# Patient Record
Sex: FEMALE | NOT HISPANIC OR LATINO | Employment: UNEMPLOYED | URBAN - NONMETROPOLITAN AREA
[De-identification: names, ages, dates, MRNs, and addresses within clinical notes are randomized per-mention and may not be internally consistent; named-entity substitution may affect disease eponyms.]

---

## 2022-01-26 LAB — HCV AB SER-ACNC: NEGATIVE

## 2024-05-14 ENCOUNTER — PATIENT OUTREACH (OUTPATIENT)
Dept: FAMILY MEDICINE CLINIC | Facility: CLINIC | Age: 34
End: 2024-05-14

## 2024-05-14 ENCOUNTER — TELEPHONE (OUTPATIENT)
Dept: ADMINISTRATIVE | Facility: OTHER | Age: 34
End: 2024-05-14

## 2024-05-14 ENCOUNTER — OFFICE VISIT (OUTPATIENT)
Dept: FAMILY MEDICINE CLINIC | Facility: CLINIC | Age: 34
End: 2024-05-14
Payer: COMMERCIAL

## 2024-05-14 ENCOUNTER — TELEPHONE (OUTPATIENT)
Dept: PSYCHIATRY | Facility: CLINIC | Age: 34
End: 2024-05-14

## 2024-05-14 VITALS
HEART RATE: 78 BPM | BODY MASS INDEX: 16.98 KG/M2 | RESPIRATION RATE: 18 BRPM | DIASTOLIC BLOOD PRESSURE: 78 MMHG | TEMPERATURE: 98.6 F | OXYGEN SATURATION: 95 % | WEIGHT: 115 LBS | SYSTOLIC BLOOD PRESSURE: 104 MMHG

## 2024-05-14 DIAGNOSIS — Z00.00 ENCOUNTER FOR MEDICAL EXAMINATION TO ESTABLISH CARE: Primary | ICD-10-CM

## 2024-05-14 DIAGNOSIS — Z87.898 H/O DOMESTIC VIOLENCE: ICD-10-CM

## 2024-05-14 DIAGNOSIS — F33.2 SEVERE EPISODE OF RECURRENT MAJOR DEPRESSIVE DISORDER, WITHOUT PSYCHOTIC FEATURES (HCC): ICD-10-CM

## 2024-05-14 DIAGNOSIS — Z59.82 TRANSPORTATION INSECURITY: ICD-10-CM

## 2024-05-14 PROCEDURE — 99203 OFFICE O/P NEW LOW 30 MIN: CPT | Performed by: STUDENT IN AN ORGANIZED HEALTH CARE EDUCATION/TRAINING PROGRAM

## 2024-05-14 RX ORDER — DULOXETIN HYDROCHLORIDE 20 MG/1
20 CAPSULE, DELAYED RELEASE ORAL DAILY
Qty: 30 CAPSULE | Refills: 1 | Status: SHIPPED | OUTPATIENT
Start: 2024-05-14 | End: 2024-07-13

## 2024-05-14 SDOH — ECONOMIC STABILITY - TRANSPORTATION SECURITY: TRANSPORTATION INSECURITY: Z59.82
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
## 2024-05-14 NOTE — TELEPHONE ENCOUNTER
Upon review of the In Basket request we were able to locate, review, and update the patient chart as requested for Hepatitis C . And cervical cancer screening    Any additional questions or concerns should be emailed to the Practice Liaisons via the appropriate education email address, please do not reply via In Basket.    Thank you  Tami Boggs MA

## 2024-05-14 NOTE — TELEPHONE ENCOUNTER
Contacted patient in regards to Routine Referral in attempts to verify patient's needs of services and add patient to proper wait list. Unable to LVM VM states is full and cannot received any messages. 1st attempt.
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
## 2024-05-14 NOTE — TELEPHONE ENCOUNTER
----- Message from Luciano Darnell MA sent at 5/13/2024  4:01 PM EDT -----  Regarding: pap hep c  05/13/24 4:02 PM    Hello, our patient Jenelle Ferreira has had Hepatitis C completed/performed. Please assist in updating the patient chart by pulling the Care Everywhere (CE) document. The date of service is 1/26/22.     05/13/24 4:03 PM    Hello, our patient Jenelle Ferreira has had Pap Smear (HPV) aka Cervical Cancer Screening completed/performed. Please assist in updating the patient chart by pulling the Care Everywhere (CE) document. The date of service is 3/24/22.     Thank you,  Luciano Darnell MA  [facility]
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
## 2024-05-17 ENCOUNTER — PATIENT OUTREACH (OUTPATIENT)
Dept: FAMILY MEDICINE CLINIC | Facility: CLINIC | Age: 34
End: 2024-05-17

## 2024-05-17 ENCOUNTER — TELEPHONE (OUTPATIENT)
Dept: PSYCHIATRY | Facility: CLINIC | Age: 34
End: 2024-05-17

## 2024-05-17 NOTE — LETTER
[address] [facility] [id number]    Re: Care Coordination   5/17/2024       Dear Jenelle,    I would like to talk with you about transportation.  Please contact me at [phone].    If you have other questions, please do not hesitate to contact me about those as well.  If I do not have an answer I will assist you in finding the appropriate agency or individual who can help.    Sincerely,         Lavern Galeas

## 2024-05-17 NOTE — PROGRESS NOTES
HENRY TURPIN placed second outreach call and was unable to leave a voicemail due to voicemail box being full. HENRY TURPIN sent Unable to Reach letter to patient's address listed on chart.    HENRY TURPIN to close referral at this time due to unable to make contact.

## 2024-05-17 NOTE — TELEPHONE ENCOUNTER
Contacted patient in regards to Routine Referral in attempts to verify patient's needs of services and add patient to proper wait list. Unable to LVM VM states is full and cannot leave messages and the other number provided has been changed. 2nd attempt.

## 2024-05-24 NOTE — TELEPHONE ENCOUNTER
IC spoke with pt about referral for services. Pt interested in med management appt for her depression. Pt added to wait list for [place] office with no provider preference.

## 2024-05-30 ENCOUNTER — TELEPHONE (OUTPATIENT)
Dept: FAMILY MEDICINE CLINIC | Facility: CLINIC | Age: 34
End: 2024-05-30

## 2024-05-30 NOTE — TELEPHONE ENCOUNTER
Ab from [facility] called francia for most recent office note I printed to be faxed to her at  [phone] thanks!

## 2024-06-04 ENCOUNTER — PATIENT OUTREACH (OUTPATIENT)
Dept: FAMILY MEDICINE CLINIC | Facility: CLINIC | Age: 34
End: 2024-06-04

## 2024-06-04 ENCOUNTER — OFFICE VISIT (OUTPATIENT)
Dept: FAMILY MEDICINE CLINIC | Facility: CLINIC | Age: 34
End: 2024-06-04
Payer: COMMERCIAL

## 2024-06-04 VITALS
BODY MASS INDEX: 16.48 KG/M2 | RESPIRATION RATE: 18 BRPM | DIASTOLIC BLOOD PRESSURE: 74 MMHG | OXYGEN SATURATION: 98 % | SYSTOLIC BLOOD PRESSURE: 114 MMHG | HEART RATE: 84 BPM | TEMPERATURE: 98.8 F | WEIGHT: 111.6 LBS

## 2024-06-04 DIAGNOSIS — F33.2 SEVERE EPISODE OF RECURRENT MAJOR DEPRESSIVE DISORDER, WITHOUT PSYCHOTIC FEATURES (HCC): ICD-10-CM

## 2024-06-04 DIAGNOSIS — Z00.00 NORMAL PHYSICAL EXAM: ICD-10-CM

## 2024-06-04 DIAGNOSIS — Z59.86 FINANCIAL INSECURITY: Primary | ICD-10-CM

## 2024-06-04 LAB
ALBUMIN SERPL BCP-MCNC: 4.3 G/DL (ref 3.5–5)
ALP SERPL-CCNC: 48 U/L (ref 34–104)
ALT SERPL W P-5'-P-CCNC: 13 U/L (ref 7–52)
ANION GAP SERPL CALCULATED.3IONS-SCNC: 9 MMOL/L (ref 4–13)
AST SERPL W P-5'-P-CCNC: 16 U/L (ref 13–39)
BILIRUB SERPL-MCNC: 0.85 MG/DL (ref 0.2–1)
BUN SERPL-MCNC: 13 MG/DL (ref 5–25)
CALCIUM SERPL-MCNC: 9 MG/DL (ref 8.4–10.2)
CHLORIDE SERPL-SCNC: 106 MMOL/L (ref 96–108)
CO2 SERPL-SCNC: 23 MMOL/L (ref 21–32)
CREAT SERPL-MCNC: 0.69 MG/DL (ref 0.6–1.3)
ERYTHROCYTE [DISTWIDTH] IN BLOOD BY AUTOMATED COUNT: 13 % (ref 11.6–15.1)
EST. AVERAGE GLUCOSE BLD GHB EST-MCNC: 111 MG/DL
GFR SERPL CREATININE-BSD FRML MDRD: 114 ML/MIN/1.73SQ M
GLUCOSE P FAST SERPL-MCNC: 93 MG/DL (ref 65–99)
HBA1C MFR BLD: 5.5 %
HCT VFR BLD AUTO: 41.9 % (ref 34.8–46.1)
HGB BLD-MCNC: 13.8 G/DL (ref 11.5–15.4)
HIV 1+2 AB+HIV1 P24 AG SERPL QL IA: NORMAL
HIV 2 AB SERPL QL IA: NORMAL
HIV1 AB SERPL QL IA: NORMAL
HIV1 P24 AG SERPL QL IA: NORMAL
MCH RBC QN AUTO: 30.3 PG (ref 26.8–34.3)
MCHC RBC AUTO-ENTMCNC: 32.9 G/DL (ref 31.4–37.4)
MCV RBC AUTO: 92 FL (ref 82–98)
PLATELET # BLD AUTO: 313 THOUSANDS/UL (ref 149–390)
PMV BLD AUTO: 11.5 FL (ref 8.9–12.7)
POTASSIUM SERPL-SCNC: 4 MMOL/L (ref 3.5–5.3)
PROT SERPL-MCNC: 6.8 G/DL (ref 6.4–8.4)
RBC # BLD AUTO: 4.55 MILLION/UL (ref 3.81–5.12)
SODIUM SERPL-SCNC: 138 MMOL/L (ref 135–147)
TSH SERPL DL<=0.05 MIU/L-ACNC: 1.55 UIU/ML (ref 0.45–4.5)
WBC # BLD AUTO: 11.78 THOUSAND/UL (ref 4.31–10.16)

## 2024-06-04 PROCEDURE — 83036 HEMOGLOBIN GLYCOSYLATED A1C: CPT | Performed by: STUDENT IN AN ORGANIZED HEALTH CARE EDUCATION/TRAINING PROGRAM

## 2024-06-04 PROCEDURE — 84443 ASSAY THYROID STIM HORMONE: CPT | Performed by: STUDENT IN AN ORGANIZED HEALTH CARE EDUCATION/TRAINING PROGRAM

## 2024-06-04 PROCEDURE — T1015 CLINIC SERVICE: HCPCS | Performed by: FAMILY MEDICINE

## 2024-06-04 PROCEDURE — 80053 COMPREHEN METABOLIC PANEL: CPT | Performed by: STUDENT IN AN ORGANIZED HEALTH CARE EDUCATION/TRAINING PROGRAM

## 2024-06-04 PROCEDURE — 87389 HIV-1 AG W/HIV-1&-2 AB AG IA: CPT | Performed by: STUDENT IN AN ORGANIZED HEALTH CARE EDUCATION/TRAINING PROGRAM

## 2024-06-04 PROCEDURE — 85027 COMPLETE CBC AUTOMATED: CPT | Performed by: STUDENT IN AN ORGANIZED HEALTH CARE EDUCATION/TRAINING PROGRAM

## 2024-06-04 RX ORDER — DULOXETIN HYDROCHLORIDE 20 MG/1
20 CAPSULE, DELAYED RELEASE ORAL DAILY
Qty: 30 CAPSULE | Refills: 1 | Status: SHIPPED | OUTPATIENT
Start: 2024-06-04 | End: 2024-08-03

## 2024-06-04 SDOH — ECONOMIC STABILITY - INCOME SECURITY: FINANCIAL INSECURITY: Z59.86

## 2024-06-04 NOTE — PROGRESS NOTES
"Ambulatory Visit  Name: Jenelle Ferreira      : 1990      MRN: [id number]  Encounter Provider: Kimberly Velazquez DO  Encounter Date: 2024   Encounter department: [facility]    Assessment & Plan   1. Financial insecurity  Comments:  -noted by patient  -Social work consulted STAT  Orders:  -     Ambulatory Referral to Social Work Care Management Program; Future; Expected date: 2024  2. Normal physical exam  Comments:  -Concerns addressed  -Annual exam drawn today   -RTC in 3 weeks  Orders:  -     HIV 1/2 AG/AB w Reflex [facility] for 2 yr old and above  -     TSH, 3rd generation with Free T4 reflex  -     Hemoglobin A1C  -     CBC and Platelet  -     Comprehensive metabolic panel  3. Severe episode of recurrent major depressive disorder, without psychotic features (HCC)  Comments:  -Same as prior, unable to obtain Cymbalta   -Recommend excedrin OTC   -Encouraged continued hydration, limiting of caffiene  -Will re-consult psych again         History of Present Illness     Jenelle Ferreira is a 33 y/o F with PMH of severe depression, substance use in remission who presents to me for follow up on depression and headaches. Pt unable to pay for Cymbalta that was prescribed at the last visit with me. Since that time her depression has become reportedly worse with more irritability. She continues to experience life stressors related to custody of her child. Also reports having \"not a single dollar to her name\". Denies any self medicating with ETOH, but admits to marijuana use. Psych attempted contacting patient x2, per chart review. Confirmed patient's phone number as the 272 number listed in her chart as her home number. Instructed pt to look out for incoming phone calls.     Pt experiencing a three day constant headache that originated from the occipital region of her head and radiates forward over the top of her head and into her sinuses. Tried taking OTC \"pain reliever\" with no " resolution of symptoms. Coffee lessens headache. Reports having an episode of passing out on her bed at the beginning of this headache where she fell onto her bed. Her significant other reports trying to communicate to her without success for about 10 minutes. No jerking movements noted. No hitting of her head when falling. Pt states she does not remember the event. A panic attack also occurred at this time. Admits to photophobia and transient vision changes when waking up. Denies numbness and tingling of the extremities. Prior to this episode she increased her water intake and decreased her caffeine intake, which seemed to resolve the majority of her headaches prior to this episode.     Social work consulted STAT for issues obtaining depression medications due to finances.           Review of Systems   Constitutional:  Negative for fever.   HENT:  Negative for congestion, sinus pressure and sinus pain.    Eyes:  Positive for photophobia and visual disturbance.   Respiratory:  Negative for shortness of breath.    Cardiovascular:  Negative for chest pain.   Gastrointestinal:  Negative for constipation, diarrhea, nausea and vomiting.   Musculoskeletal:  Negative for gait problem.   Skin:  Negative for rash.   Neurological:  Positive for headaches.   Psychiatric/Behavioral:  Positive for dysphoric mood.      Past Medical History:   Diagnosis Date    Depression     Seizures (HCC)      History reviewed. No pertinent surgical history.  Family History   Problem Relation Age of Onset    Hypertension Mother      Social History     Tobacco Use    Smoking status: Every Day     Current packs/day: 1.50     Average packs/day: 1.5 packs/day for 18.1 years (27.1 ttl pk-yrs)     Types: Cigarettes     Start date: 5/14/2006    Smokeless tobacco: Not on file   Vaping Use    Vaping status: Every Day    Substances: Nicotine, CBD, Flavoring   Substance and Sexual Activity    Alcohol use: Yes     Alcohol/week: 2.0 standard drinks of  alcohol     Types: 2 Cans of beer per week    Drug use: Yes     Types: Marijuana     Comment: Previous methamphetamine use [id number]    Sexual activity: Not on file     Current Outpatient Medications on File Prior to Visit   Medication Sig    DULoxetine (CYMBALTA) 20 mg capsule Take 1 capsule (20 mg total) by mouth daily (Patient not taking: Reported on 6/4/2024)    predniSONE 10 mg tablet Take 4 tablets (40 mg total) by mouth daily. for 4 days, then three tablets for 4 days, then two tablets for 4 days. (Patient not taking: Reported on 5/14/2024)     Allergies   Allergen Reactions    Latex Hives    Penicillins Hives    Sulfa Antibiotics Hives     Immunization History   Administered Date(s) Administered    Tdap 08/11/2022     Objective     /74   Pulse 84   Temp 98.8 °F (37.1 °C)   Resp 18   Wt 50.6 kg (111 lb 9.6 oz)   SpO2 98%   BMI 16.48 kg/m²     Physical Exam  Vitals reviewed.   Constitutional:       General: She is not in acute distress.     Appearance: She is normal weight. She is not ill-appearing or toxic-appearing.      Comments: Wearing T-shirt over head to block out light from her eyes   HENT:      Head: Normocephalic and atraumatic.      Nose: Nose normal. No congestion or rhinorrhea.      Mouth/Throat:      Mouth: Mucous membranes are moist.      Pharynx: No oropharyngeal exudate or posterior oropharyngeal erythema.   Eyes:      Extraocular Movements: Extraocular movements intact.      Conjunctiva/sclera: Conjunctivae normal.      Pupils: Pupils are equal, round, and reactive to light.   Cardiovascular:      Rate and Rhythm: Normal rate and regular rhythm.      Heart sounds: Normal heart sounds. No murmur heard.     No friction rub. No gallop.   Pulmonary:      Effort: Pulmonary effort is normal. No respiratory distress.      Breath sounds: Normal breath sounds. No wheezing, rhonchi or rales.   Musculoskeletal:      Right lower leg: No edema.      Left lower leg: No edema.   Skin:      General: Skin is warm and dry.      Findings: No rash.   Neurological:      General: No focal deficit present.      Mental Status: She is alert and oriented to person, place, and time.      GCS: GCS eye subscore is 4. GCS verbal subscore is 5. GCS motor subscore is 6.      Cranial Nerves: Cranial nerves 2-12 are intact. No cranial nerve deficit, dysarthria or facial asymmetry.      Sensory: Sensation is intact.      Motor: No weakness, tremor or abnormal muscle tone.      Gait: Gait normal.   Psychiatric:         Attention and Perception: Attention normal.         Mood and Affect: Mood is depressed. Affect is flat.         Speech: Speech normal.         Behavior: Behavior normal. Behavior is cooperative.         Thought Content: Thought content normal. Thought content does not include homicidal or suicidal plan.

## 2024-06-04 NOTE — PROGRESS NOTES
HENRY TURPIN received referral from PCP d/t pt needing assistance with the cost of her medication. Pt does not have insurance.    HENRY TURPIN placed call to pt and introduced self. Pt stated that she lost her paper Rx and asked if PCP could call Rx into [facility] in [place]. IB sent to PCP and PCP was able to do so.    HENRY TURPIN asked that pt find out the cost of her medication when the pharmacy fills it and we can discuss options. Pt stated she did receive Good Rx cards from her provider.    Will f/u with pt tomorrow.

## 2024-06-05 ENCOUNTER — PATIENT OUTREACH (OUTPATIENT)
Dept: FAMILY MEDICINE CLINIC | Facility: CLINIC | Age: 34
End: 2024-06-05

## 2024-06-05 NOTE — PROGRESS NOTES
HENRY CM placed call to pt's pharmacy to find out cost of medication ordered. Pharmacy staff stated Rx was picked up yesterday and cost $12.79 with [facility]. Note routed to PCP. OP CM team will remain available for future needs.

## 2024-06-21 ENCOUNTER — TELEPHONE (OUTPATIENT)
Dept: BEHAVIORAL/MENTAL HEALTH CLINIC | Facility: CLINIC | Age: 34
End: 2024-06-21

## 2024-06-21 ENCOUNTER — TELEPHONE (OUTPATIENT)
Dept: FAMILY MEDICINE CLINIC | Facility: CLINIC | Age: 34
End: 2024-06-21

## 2024-06-21 ENCOUNTER — DOCUMENTATION (OUTPATIENT)
Dept: BEHAVIORAL/MENTAL HEALTH CLINIC | Facility: CLINIC | Age: 34
End: 2024-06-21
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
## 2024-06-21 NOTE — TELEPHONE ENCOUNTER
PC- to Crisis- spoke with Lavern, relayed concerns and that Jenelle was in agreement with a daily check in call.  Crisis will be doing this.  Therapist faxed over Risk Assessment and Safety Plan

## 2024-06-21 NOTE — TELEPHONE ENCOUNTER
Received message from licensed clinical  at [facility] that patient had thoughts of SI with plan.    Called patient at phone number listed in chart and verified identity before sharing patient information.  Jenelle shared that about 3 weeks after starting her Cymbalta, she had very vivid SI thoughts with plan.  Specifically she wanted to stab herself in the neck. Her fiancé helped her from acting on these plans. She expresses to the licensed clinical  who created a safety plan with her and is having crisis check in with her daily.  Patient feels this is related to medication.    Today, patient does not have intrusive SI thoughts. She has since stopped the medication, which this PCP told her to continue to do.  Instructed patient to review her safety plan and that crisis would be calling her daily. Educated on when to call 911 or go to the ED for SI. Patient voiced understanding and agreement expressing that the thoughts scared her and she did not want to die.    Next patient appointment with me in 5 days.  Patient verbalizes intent to make it to that appointment.    All questions answered at end of phone call.

## 2024-06-26 ENCOUNTER — TELEPHONE (OUTPATIENT)
Dept: FAMILY MEDICINE CLINIC | Facility: CLINIC | Age: 34
End: 2024-06-26

## 2024-06-26 NOTE — TELEPHONE ENCOUNTER
Ptnt called asking about venslaxina , Dr Velazquez said she cannot refill it til she sees her , since she missed her apt 6/26

## 2024-07-02 ENCOUNTER — OFFICE VISIT (OUTPATIENT)
Dept: FAMILY MEDICINE CLINIC | Facility: CLINIC | Age: 34
End: 2024-07-02
Payer: COMMERCIAL

## 2024-07-02 VITALS
RESPIRATION RATE: 16 BRPM | DIASTOLIC BLOOD PRESSURE: 62 MMHG | SYSTOLIC BLOOD PRESSURE: 128 MMHG | OXYGEN SATURATION: 99 % | BODY MASS INDEX: 16.39 KG/M2 | WEIGHT: 111 LBS | TEMPERATURE: 97.7 F | HEART RATE: 89 BPM

## 2024-07-02 DIAGNOSIS — R51.9 CHRONIC NONINTRACTABLE HEADACHE, UNSPECIFIED HEADACHE TYPE: ICD-10-CM

## 2024-07-02 DIAGNOSIS — G47.9 SLEEP DISTURBANCE: ICD-10-CM

## 2024-07-02 DIAGNOSIS — F33.2 SEVERE EPISODE OF RECURRENT MAJOR DEPRESSIVE DISORDER, WITHOUT PSYCHOTIC FEATURES (HCC): Primary | ICD-10-CM

## 2024-07-02 DIAGNOSIS — G89.29 CHRONIC NONINTRACTABLE HEADACHE, UNSPECIFIED HEADACHE TYPE: ICD-10-CM

## 2024-07-02 PROCEDURE — T1015 CLINIC SERVICE: HCPCS | Performed by: FAMILY MEDICINE

## 2024-07-02 RX ORDER — VENLAFAXINE HYDROCHLORIDE 75 MG/1
75 TABLET, EXTENDED RELEASE ORAL
Qty: 30 TABLET | Refills: 1 | Status: SHIPPED | OUTPATIENT
Start: 2024-07-02 | End: 2024-08-31

## 2024-07-02 RX ORDER — HYDROXYZINE HYDROCHLORIDE 10 MG/1
10 TABLET, FILM COATED ORAL
Qty: 30 TABLET | Refills: 0 | Status: SHIPPED | OUTPATIENT
Start: 2024-07-02 | End: 2024-08-01
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
## 2024-07-02 NOTE — PATIENT INSTRUCTIONS
1) 400 mg of B2 (aka riboflavin) a day and magnesium once a day   2) Dr. Velazquez to reach out to [facility] about scheduling   3) Follow up in one month, will re-eval headaches and mirtazapine use   4) [facility] info provided

## 2024-08-19 ENCOUNTER — TELEPHONE (OUTPATIENT)
Dept: FAMILY MEDICINE CLINIC | Facility: CLINIC | Age: 34
End: 2024-08-19

## 2024-08-19 NOTE — TELEPHONE ENCOUNTER
"Called patient at home phone number starting with \"272\" and mobile phone. No answer on either. LVM on home phone number beginning with [phone] to inquire about patient missing last appointment.   "

## 2024-08-29 ENCOUNTER — OFFICE VISIT (OUTPATIENT)
Dept: URGENT CARE | Facility: MEDICAL CENTER | Age: 34
End: 2024-08-29
Payer: COMMERCIAL

## 2024-08-29 VITALS
DIASTOLIC BLOOD PRESSURE: 72 MMHG | HEART RATE: 86 BPM | SYSTOLIC BLOOD PRESSURE: 98 MMHG | RESPIRATION RATE: 18 BRPM | OXYGEN SATURATION: 99 % | TEMPERATURE: 98 F

## 2024-08-29 DIAGNOSIS — S16.1XXA CERVICAL STRAIN, ACUTE, INITIAL ENCOUNTER: Primary | ICD-10-CM

## 2024-08-29 DIAGNOSIS — H10.32 ACUTE CONJUNCTIVITIS OF LEFT EYE, UNSPECIFIED ACUTE CONJUNCTIVITIS TYPE: ICD-10-CM

## 2024-08-29 PROCEDURE — S9088 SERVICES PROVIDED IN URGENT: HCPCS | Performed by: PHYSICIAN ASSISTANT

## 2024-08-29 PROCEDURE — 99213 OFFICE O/P EST LOW 20 MIN: CPT | Performed by: PHYSICIAN ASSISTANT

## 2024-08-29 RX ORDER — METHOCARBAMOL 750 MG/1
750 TABLET, FILM COATED ORAL EVERY 6 HOURS PRN
Qty: 20 TABLET | Refills: 0 | Status: SHIPPED | OUTPATIENT
Start: 2024-08-29

## 2024-08-29 RX ORDER — TOBRAMYCIN 3 MG/ML
1 SOLUTION/ DROPS OPHTHALMIC
Qty: 5 ML | Refills: 0 | Status: SHIPPED | OUTPATIENT
Start: 2024-08-29
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
## 2024-08-29 NOTE — PATIENT INSTRUCTIONS
Start antibiotic eye drops  You are contagious for 24 hrs after starting the drops  If symptoms worsen follow up with an eye doctor    Bernarda Russell Alvarez    [address] [facility] [address] [phone]    Take Robaxin as needed for neck pain  May also take Tylenol or Ibuprofen  If symtoms fail to improve follow up with PCP

## 2024-09-23 ENCOUNTER — TELEPHONE (OUTPATIENT)
Age: 34
End: 2024-09-23

## 2024-09-23 NOTE — TELEPHONE ENCOUNTER
"Contacted patient off of Medication Management  to verify needs of services in attempts to offer patient an appointment. Spoke tanvi Frias (Friend) whom stated \"you cannot reach her at this number anymore\" writer asked if they had better contact number for patient which they responded they did not. Unable to contact patient  "

## 2024-10-24 ENCOUNTER — TELEPHONE (OUTPATIENT)
Dept: FAMILY MEDICINE CLINIC | Facility: CLINIC | Age: 34
End: 2024-10-24

## 2024-10-24 NOTE — TELEPHONE ENCOUNTER
----- Message from Kimberly Velazquez DO sent at 10/21/2024  1:20 PM EDT -----  Regarding: Appointment  Please contact patient to schedule appointment for mood follow up. Thank you!     -Kimberly

## 2024-10-24 NOTE — TELEPHONE ENCOUNTER
Patient does not have a number in chart for herself. Tried calling emergency contact but that number doesn't work. Patient does not have [employer] set up, so unable to send message. Mailed letter to patient to call office